# Patient Record
Sex: FEMALE | Race: BLACK OR AFRICAN AMERICAN | NOT HISPANIC OR LATINO | Employment: FULL TIME | URBAN - METROPOLITAN AREA
[De-identification: names, ages, dates, MRNs, and addresses within clinical notes are randomized per-mention and may not be internally consistent; named-entity substitution may affect disease eponyms.]

---

## 2024-09-19 DIAGNOSIS — Z87.891 PERSONAL HISTORY OF TOBACCO USE, PRESENTING HAZARDS TO HEALTH: Primary | ICD-10-CM

## 2024-10-02 ENCOUNTER — HOSPITAL ENCOUNTER (OUTPATIENT)
Dept: RADIOLOGY | Facility: HOSPITAL | Age: 59
Discharge: HOME | End: 2024-10-02
Attending: FAMILY MEDICINE
Payer: COMMERCIAL

## 2024-10-02 DIAGNOSIS — Z12.2 ENCOUNTER FOR SCREENING FOR CANCER OF RESPIRATORY ORGANS: ICD-10-CM

## 2024-10-02 PROCEDURE — 71271 CT THORAX LUNG CANCER SCR C-: CPT | Mod: TC

## 2025-04-23 ENCOUNTER — TELEPHONE (OUTPATIENT)
Dept: RADIOLOGY | Facility: HOSPITAL | Age: 60
End: 2025-04-23
Payer: COMMERCIAL

## 2025-04-23 NOTE — TELEPHONE ENCOUNTER
----- Message from JOHNNY Randle sent at 4/23/2025  2:43 PM CDT -----  Regarding: RE: 3  4/23/25-Spoke with Teri with Dr. Fierro's office. Follow up CT chest order faxed to [facility].  ----- Message -----  From: Razia Huggins RN  Sent: 4/17/2025  12:00 AM CDT  To: Razia Huggins RN  Subject: RE: 3                                            Follow up reminder fax sent to Dr. Fierro's office.  ----- Message -----  From: Razia Huggins RN  Sent: 3/31/2025  12:00 AM CDT  To: Razia Huggins RN  Subject: RE: 3                                            Spoke with Teri with Dr. Fierro's office. Dr. Fierro will order follow up scan in 6 months.  ----- Message -----  From: Razia Huggins RN  Sent: 10/4/2024   7:35 AM CDT  To: Razia Huggins RN  Subject: 3                                                3